# Patient Record
Sex: MALE | Race: WHITE
[De-identification: names, ages, dates, MRNs, and addresses within clinical notes are randomized per-mention and may not be internally consistent; named-entity substitution may affect disease eponyms.]

---

## 2021-12-22 ENCOUNTER — HOSPITAL ENCOUNTER (OUTPATIENT)
Dept: HOSPITAL 47 - SLEEP | Age: 61
End: 2021-12-22
Attending: INTERNAL MEDICINE
Payer: COMMERCIAL

## 2021-12-22 DIAGNOSIS — G47.33: Primary | ICD-10-CM

## 2021-12-22 DIAGNOSIS — Z87.81: ICD-10-CM

## 2021-12-22 DIAGNOSIS — K21.9: ICD-10-CM

## 2021-12-22 DIAGNOSIS — J34.9: ICD-10-CM

## 2021-12-22 DIAGNOSIS — I10: ICD-10-CM

## 2021-12-22 DIAGNOSIS — Z99.89: ICD-10-CM

## 2021-12-22 PROCEDURE — 99211 OFF/OP EST MAY X REQ PHY/QHP: CPT

## 2021-12-22 NOTE — CONS
CONSULTATION



DATE OF SERVICE:

12/22/2021



This 61-year-old gentleman has been evaluated in [facility] for obstructive sleep

apnea-hypopnea syndrome.



HISTORY OF PRESENT ILLNESS/SLEEP-WAKE EVALUATION:

Patient has had a history of obstructive sleep apnea for 15 years.  He continues to use

his CPAP equipment every night.  His CPAP unit is extremely old.



His sleep schedule is from 9:30 p.m. to 5 a.m. on weekdays and from 10 p.m. to between

5 and 6:30 a.m. on weekends. Sometimes he has problems with falling asleep, although no

TV in bedroom. He usually sleeps on the back and side positions. He may wake up from

sleep several times with snoring, although he is using his CPAP unit. During the day he

has episodes of irritability, anxiety, and he wakes up tired.  [place] Sleepiness Scale

is 6, but he takes naps for about 45 minutes at 3:30 p.m. He feels refreshed after

naps, does not see any vivid dreams.



No history of hypnagogic hallucinations, sleep paralysis or cataplexy.



PAST MEDICAL HISTORY:

Positive for hypertension, sinus problems, acid reflux.



PAST SURGICAL HISTORY:

Left leg fracture, status post plate insertion.



MEDICATIONS:

1. Verapamil 330 mg once a day.

2. Atenolol 50 mg once a day.



SOCIAL HISTORY:

Negative for smoking. Alcohol consumption occasional.



REVIEW OF SYSTEMS:

Sometimes awakenings from sleep, even while using his CPAP equipment. Takes naps in the

middle of the day.



No fevers.  No double vision.  No recent chest pain.  No shortness of breath.  No

abdominal pain.  No bleeding episodes.  No blood in the urine.  No seizure episodes.



FAMILY HISTORY:

Hypertension.



PHYSICAL EXAMINATION:

GENERAL: Pleasant patient in no distress.

VITAL SIGNS: /83, HR 60, RR 14, height 6 feet 1 inch, weight 224.4 pounds, body

mass index 29.5, temperature 97.2, oxygen saturation at room air 98%.

HEENT: PERRLA, EOMI, evaluation of oropharynx showed tongue protrudes midline.

Extremely low position of soft palate; Mallampati IV.

NECK:  Supple, no JVD.  Thyroid is not palpable. Neck measures 16-1/2 inches in

circumference.

LUNGS:  Clear to percussion and to auscultation.  Good air exchange.  No wheezing or

rhonchi.

HEART:  S1, S2 regular.  No murmurs, gallops, or rubs.

ABDOMEN:  Soft and nontender.  Bowel sounds are present.  No organomegaly appreciated.

EXTREMITIES: No clubbing or cyanosis.

CNS:  Awake, alert, and oriented X3.  Cranial nerves 2 to 7 intact.  There is no

fasciculation or atrophy. noted.  No focal deficits observed.



IMPRESSION:

1. Obstructive sleep apnea-hypopnea syndrome for 15 years.  Patient continues to use

    his CPAP equipment every night.  CPAP unit is extremely old. Results of previous

    sleep studies possibly will not be available. Extremely low position of soft

    palate, multiple awakenings from sleep, even while on treatment with CPAP;

    obstructive sleep apnea-hypopnea syndrome.

2. Some sleepiness; patient takes naps in the middle of the day.

3. Hypertension.

4. Sinus problems.

5. Acid reflux.

6. Status post left leg fracture with plate insertion.



PLAN:

1. Home sleep apnea test.

2. After the testing, the patient will get new CPAP equipment with a Roy FX large-

    sized nasal pillow mask.  This is his preference.

3. Sleep hygiene with regular time in bed for 7-1/2 to 8 hours.

4. No driving if feeling sleepiness.

Thank you very much for referring this patient for consultation.



Sincerely,







Robert Shafer MD, PhD, FAASM

Diplomat of American Board of [employer] Specialties

Sleep Medicine Board of American Board of Internal Medicine

Medical Director of [facility]





MMODL / STEFANON: [id number] / Job#: [id number]

## 2022-05-26 ENCOUNTER — HOSPITAL ENCOUNTER (OUTPATIENT)
Dept: HOSPITAL 47 - SLEEP | Age: 62
End: 2022-05-26
Attending: INTERNAL MEDICINE
Payer: COMMERCIAL

## 2022-05-26 DIAGNOSIS — Z98.890: ICD-10-CM

## 2022-05-26 DIAGNOSIS — G47.33: Primary | ICD-10-CM

## 2022-05-26 DIAGNOSIS — K21.9: ICD-10-CM

## 2022-05-26 DIAGNOSIS — Z79.899: ICD-10-CM

## 2022-05-26 DIAGNOSIS — Z99.89: ICD-10-CM

## 2022-05-26 DIAGNOSIS — I10: ICD-10-CM

## 2022-05-26 DIAGNOSIS — J34.9: ICD-10-CM

## 2022-05-26 NOTE — SFUN
SLEEP CENTER FOLLOW UP NOTE



DATE OF SERVICE:

05/26/2022



This 62-year-old gentleman has been followed in Sleep Center for treatment of

obstructive sleep apnea-hypopnea syndrome.



Recently the patient had a home sleep apnea test which showed severe obstructive sleep

apnea-hypopnea syndrome.  I discussed results of the sleep study with the patient in

detail.



Subsequently he received a new CPAP unit, and today is his first visit with the new

machine.



The patient is able to use CPAP equipment every night. He knows how to adjust humidity.

[place] Sleepiness Scale today is 6, which is normal.



I checked information from his PAP unit. Usage is 100% of nights, average 6 hours 53

minutes.  Machine is in automatic regimen 5-15 cm of water.  Average pressure is 9.7 cm

of water, maximum 10.9. Leak is increased to 36.6, maximum 50.4. Apnea-hypopnea index,

though, is 0.8, which is normal.



The patient asking to decrease the pressure in the machine.



MEDICATIONS:

Verapamil, atenolol.



PHYSICAL EXAMINATION:

GENERAL: Pleasant patient in no distress.

VITAL SIGNS: /78, HR 60, RR 16, weight 232 pounds, temperature 98.1, oxygen

saturation at room air 96%.

HEENT: PERRLA, EOMI, evaluation of oropharynx showed tongue protrudes midline.

Extremely low position of soft palate; Mallampati IV.

NECK:  Supple, no JVD.  Thyroid is not palpable.

LUNGS:  Clear to percussion and to auscultation.  Good air exchange.  No wheezing or

rhonchi.

HEART:  S1, S2 regular.  No murmurs, gallops, or rubs.

ABDOMEN:  Soft and nontender.  Bowel sounds are present.  No organomegaly appreciated.

EXTREMITIES: No clubbing or cyanosis.

CNS:  Awake, alert, and oriented X3.  Cranial nerves 2 to 7 intact.  There is no

fasciculation or atrophy. noted.  No focal deficits observed.



IMPRESSION:

1. Obstructive sleep apnea-hypopnea syndrome for many years in severe range, but by

    recent home sleep apnea test. The patient demonstrated 100% compliance with

    treatment, benefitting from treatment. Normal respiration on CPAP.

2. Hypertension.

3. Sinus problems.

4. Acid reflux.

5. Status post left leg fracture with plate insertion.



PLAN:

1. I decreased maximum pressure in the machine to 12. Now pressure in the machine will

    be in the range between 5 and 12.

2. Patient will continue to use PAP equipment every night for the whole night.

3. Sleep hygiene with regular time in bed for at least 7-1/2 to 8 hours.

4. Precautions related to driving. No driving if feeling sleepiness.

5. I will maintain all necessary prescription for PAP supplies including mask, tube,

    filters.

6. Watching weight.

7. Follow-up visit in 6 months or earlier if patient has any problems.



Thank you very much for allowing me to participate in the management of your patient.



Sincerely,







Robert Shafre MD, PhD, FAASM

Diplomat of American Board of Medical Specialties

Sleep Medicine Board of American Board of Internal Medicine

Medical Director of [facility] [place]





MMMARCO ANTONIO / STEFANON: [id number] / Job#: [id number]

## 2023-05-24 ENCOUNTER — HOSPITAL ENCOUNTER (OUTPATIENT)
Dept: HOSPITAL 47 - SLEEP | Age: 63
End: 2023-05-24
Payer: MEDICARE

## 2023-05-24 DIAGNOSIS — K82.8: ICD-10-CM

## 2023-05-24 DIAGNOSIS — Z99.89: ICD-10-CM

## 2023-05-24 DIAGNOSIS — Z79.899: ICD-10-CM

## 2023-05-24 DIAGNOSIS — I10: ICD-10-CM

## 2023-05-24 DIAGNOSIS — Z98.890: ICD-10-CM

## 2023-05-24 DIAGNOSIS — G47.33: Primary | ICD-10-CM

## 2023-05-24 DIAGNOSIS — K21.9: ICD-10-CM

## 2023-05-24 PROCEDURE — 99212 OFFICE O/P EST SF 10 MIN: CPT

## 2023-05-24 NOTE — P.PN
Subjective


DATE: 05/24/2023





FOLLOW UP VISIT.





Patient with obstructive sleep apnea hypopnea syndrome return to sleep center 

for follow-up visit. 


Information from previous visit have been reviewed.  


 Patient is using PAP equipment every night for the whole night, getting PAP 

supplies in time.


The patient does not have significant problems with the mask, PAP unit and 

humidification. [place] sleepiness scale is 7, which is normal.


I checked information from PAP unit. 


PAP unit pressure 5-12, average 9.8 cm H2O. 


Usage is 100 % for more then 4 hours, average 7.2 hours per night. 


Leak is increased to 40.6 l/m. 


Apnea Hypopnea Index is 0.7, which is normal.  





MEDICATIONS:1.  Verapamil 360 mg once a day


                          2.  Omeprazole 40 mg once a day


                          3.  Atenolol 50 mg once a day


                       


During physical exam:


GENERAL: A pleasant patient without any distress. 


VITAL SIGNS: /98, HR 60, RR 14 , weight 235, temperature 97.2, oxygen 

saturation at room air 99 % .


HEENT: PERRLA, EOMI.low position of soft palate, Mallapati 4 .


NECK: Supple. No JVD. 


LUNGS: Clear to percussion and to auscultation. Good air exchange. No wheezing 

or rhonchi. 


HEART: S1, S2 regular. 


ABDOMEN: Soft and nontender.[]  


EXTREMITIES: No clubbing or cyanosis. 


CNS: Awake, alert, and oriented x3.  No focal deficit. 





Impressions:


1.  Obstructive sleep apnea-hypopnea syndrome. Patient demonstrated great 

compliance with treatment, benefiting from treatment.


2.  Hypertension.


3.  Acid reflux.


4.  Sinuses problems.


5.  Status post left leg fracture with plate insertion.








Plan:


1.  Continue using PAP equipment every night for the whole night.


2.  To change air filter at least 1-2 times per month.


3.  PAP unit should stay lower then position of the head.


4.  Advised patient to remove all remaining water from humidifier canister daily

 and make it dry after each usage. Refill canister with fresh distilled water 

before each usage. 


5.  Sleep hygiene with regular time in bed for at least 8 hours.


6.  Precautions related to driving. No driving if feel any sleepiness.


7.  I will maintain prescription for PAP supplies including mask, tube, filters.


8. Follow up visit in 6 months or earlier if patient has any problems.


9. Watching and losing weight.








Thank you very much for allowing me to participate in the management of your 

patient.








Robert Shafer MD, PhD, FAASM.


Diplomat of American Board of Sleep Medicine,


Sleep Medicine Board by American Board of Internal Medicine


Medical Director of [facility]

## 2025-04-02 ENCOUNTER — HOSPITAL ENCOUNTER (OUTPATIENT)
Dept: HOSPITAL 47 - 3 N SLEEP | Age: 65
End: 2025-04-02
Payer: MEDICARE

## 2025-04-02 VITALS
SYSTOLIC BLOOD PRESSURE: 155 MMHG | DIASTOLIC BLOOD PRESSURE: 87 MMHG | RESPIRATION RATE: 16 BRPM | TEMPERATURE: 97.6 F | HEART RATE: 72 BPM

## 2025-04-02 DIAGNOSIS — G47.33: Primary | ICD-10-CM

## 2025-04-02 DIAGNOSIS — K21.9: ICD-10-CM

## 2025-04-02 DIAGNOSIS — Z96.698: ICD-10-CM

## 2025-04-02 DIAGNOSIS — Z86.79: ICD-10-CM

## 2025-04-02 DIAGNOSIS — J34.89: ICD-10-CM

## 2025-04-02 DIAGNOSIS — I10: ICD-10-CM

## 2025-04-02 PROCEDURE — 99212 OFFICE O/P EST SF 10 MIN: CPT
